# Patient Record
Sex: FEMALE | Race: WHITE
[De-identification: names, ages, dates, MRNs, and addresses within clinical notes are randomized per-mention and may not be internally consistent; named-entity substitution may affect disease eponyms.]

---

## 2022-12-27 ENCOUNTER — HOSPITAL ENCOUNTER (EMERGENCY)
Dept: HOSPITAL 47 - EC | Age: 62
LOS: 1 days | Discharge: HOME | End: 2022-12-28
Payer: MEDICARE

## 2022-12-27 VITALS — TEMPERATURE: 96.7 F | RESPIRATION RATE: 18 BRPM

## 2022-12-27 VITALS — HEART RATE: 72 BPM

## 2022-12-27 DIAGNOSIS — Z88.8: ICD-10-CM

## 2022-12-27 DIAGNOSIS — Z88.0: ICD-10-CM

## 2022-12-27 DIAGNOSIS — E11.65: Primary | ICD-10-CM

## 2022-12-27 DIAGNOSIS — Z88.1: ICD-10-CM

## 2022-12-27 DIAGNOSIS — Z88.2: ICD-10-CM

## 2022-12-27 DIAGNOSIS — N39.0: ICD-10-CM

## 2022-12-27 DIAGNOSIS — Z20.822: ICD-10-CM

## 2022-12-27 LAB
ALBUMIN SERPL-MCNC: 2.4 G/DL (ref 3.5–5)
ALP SERPL-CCNC: 180 U/L (ref 38–126)
ALT SERPL-CCNC: 27 U/L (ref 4–34)
ANION GAP SERPL CALC-SCNC: 4 MMOL/L
AST SERPL-CCNC: 25 U/L (ref 14–36)
BASOPHILS # BLD AUTO: 0 K/UL (ref 0–0.2)
BASOPHILS NFR BLD AUTO: 0 %
BUN SERPL-SCNC: 26 MG/DL (ref 7–17)
CALCIUM SPEC-MCNC: 7.7 MG/DL (ref 8.4–10.2)
CHLORIDE SERPL-SCNC: 103 MMOL/L (ref 98–107)
CO2 SERPL-SCNC: 26 MMOL/L (ref 22–30)
EOSINOPHIL # BLD AUTO: 0.1 K/UL (ref 0–0.7)
EOSINOPHIL NFR BLD AUTO: 1 %
ERYTHROCYTE [DISTWIDTH] IN BLOOD BY AUTOMATED COUNT: 3.03 M/UL (ref 3.8–5.4)
ERYTHROCYTE [DISTWIDTH] IN BLOOD: 13.2 % (ref 11.5–15.5)
GLUCOSE BLD-MCNC: 414 MG/DL (ref 70–110)
GLUCOSE BLD-MCNC: 519 MG/DL (ref 70–110)
GLUCOSE SERPL-MCNC: 412 MG/DL (ref 74–99)
GLUCOSE UR QL: (no result)
HCO3 BLDV-SCNC: 31 MMOL/L (ref 24–28)
HCT VFR BLD AUTO: 29.8 % (ref 34–46)
HGB BLD-MCNC: 10 GM/DL (ref 11.4–16)
LYMPHOCYTES # SPEC AUTO: 1.1 K/UL (ref 1–4.8)
LYMPHOCYTES NFR SPEC AUTO: 11 %
MAGNESIUM SPEC-SCNC: 1.5 MG/DL (ref 1.6–2.3)
MCH RBC QN AUTO: 32.9 PG (ref 25–35)
MCHC RBC AUTO-ENTMCNC: 33.4 G/DL (ref 31–37)
MCV RBC AUTO: 98.4 FL (ref 80–100)
MONOCYTES # BLD AUTO: 0.8 K/UL (ref 0–1)
MONOCYTES NFR BLD AUTO: 8 %
NEUTROPHILS # BLD AUTO: 7.4 K/UL (ref 1.3–7.7)
NEUTROPHILS NFR BLD AUTO: 77 %
PCO2 BLDV: 50 MMHG (ref 37–51)
PH BLDV: 7.41 [PH] (ref 7.31–7.41)
PH UR: 5.5 [PH] (ref 5–8)
PLATELET # BLD AUTO: 477 K/UL (ref 150–450)
POTASSIUM SERPL-SCNC: 4.1 MMOL/L (ref 3.5–5.1)
PROT SERPL-MCNC: 4.9 G/DL (ref 6.3–8.2)
RBC UR QL: <1 /HPF (ref 0–5)
SODIUM SERPL-SCNC: 133 MMOL/L (ref 137–145)
SP GR UR: 1.03 (ref 1–1.03)
SQUAMOUS UR QL AUTO: 1 /HPF (ref 0–4)
UROBILINOGEN UR QL STRIP: <2 MG/DL (ref ?–2)
WBC # BLD AUTO: 9.6 K/UL (ref 3.8–10.6)
WBC # UR AUTO: 1 /HPF (ref 0–5)

## 2022-12-27 PROCEDURE — 81001 URINALYSIS AUTO W/SCOPE: CPT

## 2022-12-27 PROCEDURE — 99285 EMERGENCY DEPT VISIT HI MDM: CPT

## 2022-12-27 PROCEDURE — 85025 COMPLETE CBC W/AUTO DIFF WBC: CPT

## 2022-12-27 PROCEDURE — 82009 KETONE BODYS QUAL: CPT

## 2022-12-27 PROCEDURE — 80053 COMPREHEN METABOLIC PANEL: CPT

## 2022-12-27 PROCEDURE — 71045 X-RAY EXAM CHEST 1 VIEW: CPT

## 2022-12-27 PROCEDURE — 87636 SARSCOV2 & INF A&B AMP PRB: CPT

## 2022-12-27 PROCEDURE — 83605 ASSAY OF LACTIC ACID: CPT

## 2022-12-27 PROCEDURE — 82803 BLOOD GASES ANY COMBINATION: CPT

## 2022-12-27 PROCEDURE — 96360 HYDRATION IV INFUSION INIT: CPT

## 2022-12-27 PROCEDURE — 83735 ASSAY OF MAGNESIUM: CPT

## 2022-12-27 PROCEDURE — 36415 COLL VENOUS BLD VENIPUNCTURE: CPT

## 2022-12-27 NOTE — XR
EXAMINATION TYPE: XR chest 1V portable

 

DATE OF EXAM: 12/27/2022 8:49 PM

 

COMPARISON: None

 

TECHNIQUE: XR chest 1V portable Portable AP radiograph of the chest.

 

CLINICAL INDICATION:Female, 62 years old with history of hyperglycemia; 

 

FINDINGS: 

Lungs/Pleura: Rotated exam There is no evidence of pleural effusion, focal consolidation, or pneumoth
orax.  

Pulmonary vascularity: Unremarkable.

Heart/mediastinum: Cardiomediastinal silhouette is enlarged and stable.

Musculoskeletal: Degenerative changes of the shoulder joints.

 

IMPRESSION: 

Rotated exam with cardiomegaly no obvious consolidation acute cardiopulmonary disease/process.

## 2022-12-27 NOTE — ED
General Adult HPI





- General


Chief complaint: Recheck/Abnormal Lab/Rx


Stated complaint: hyperglycemia


Time Seen by Provider: 12/27/22 19:01


Source: EMS, RN notes reviewed, old records reviewed


Mode of arrival: EMS


Limitations: altered mental status, physical limitation





- History of Present Illness


Initial comments: 





Patient is a 62-year-old female with past medical history remarkable for being 

nonverbal, diabetes, hyperlipidemia, who presents emergency Department after 

group home staff found that her point-of-care Accu-Chek read "high."  They were 

uncertain how to administer insulin.  Sent here for further evaluation.  Patient

is currently at her baseline per EMS.  She is unable to provide any form of 

history, as she is nonverbal.  She is extremely petite.  In no obvious distress.

 Presents for further evaluation.





- Related Data


                                  Previous Rx's











 Medication  Instructions  Recorded


 


Nitrofurantoin Monohyd/M-Cryst 100 mg PO Q12HR 7 Days #14 cap 12/28/22





[Macrobid]  











                                    Allergies











Allergy/AdvReac Type Severity Reaction Status Date / Time


 


fluoxetine [From Prozac] Allergy  Unknown Verified 12/27/22 19:04


 


Penicillins Allergy  Unknown Verified 12/27/22 19:04


 


sulfamethoxazole Allergy  Unknown Verified 12/27/22 19:04





[From Bactrim]     


 


trimethoprim [From Bactrim] Allergy  Unknown Verified 12/27/22 19:04














Review of Systems


ROS Statement: 


Those systems with pertinent positive or pertinent negative responses have been 

documented in the HPI.





ROS Other: All systems not noted in ROS Statement are negative.





Past Medical History


Past Medical History: Diabetes Mellitus, Hyperlipidemia, Thyroid Disorder


Additional Past Medical History / Comment(s): Type 2, Pulmonary aneurism, Heart 

murmur, Hypothyroidis, Hydrocephalus, non-verbal


History of Any Multi-Drug Resistant Organisms: Unobtainable


Past Surgical History: Unable to Obtain


Past Psychological History: Unable to Obtain


Smoking Status: Unknown if ever smoked


Past Alcohol Use History: Unable to Obtain


Past Drug Use History: Unable to Obtain





General Exam





- General Exam Comments


Initial Comments: 





General: Appears in no acute distress.


HEAD:  Normal with no signs of head trauma.


EYES:  PERRLA, EOMI, conjunctiva normal, no discharge.


ENT:  Hearing grossly intact, normal oropharynx.  Mildly dry mucosa membranes.


RESPIRATORY:  Clear breath sounds bilaterally.  No wheezes, rales, or rhonchi.  

No respiratory distress.


C/V:  Regular rate and rhythm. S1 and S2 auscultated, no edema, peripheral 

pulses 2+ and intact throughout


ABD:  Abd is soft, nontender, nondistended


EXT: Normal range of motion, no obvious deformity


SKIN:  No rashes or lesions observed on exposed skin.


NEURO: Alert.  Nonverbal.  Apparently at her baseline per EMS.


Limitations: altered mental status, physical limitation





Course


                                   Vital Signs











  12/27/22 12/27/22 12/28/22





  18:46 22:17 00:15


 


Temperature 96.7 F L  


 


Pulse Rate 75 72 


 


Respiratory 18 18 18





Rate   


 


Blood Pressure 105/58 110/68 130/70


 


O2 Sat by Pulse 93 L 97 96





Oximetry   














Medical Decision Making





- Medical Decision Making





Was pt. sent in by a medical professional or institution?


@  -[facility] home


Did you speak to anyone other than the patient for history?  


@  -EMS


Did you review nursing and triage notes? 


@  -Yes.  Agreed.


Were old charts reviewed? 


@  -Outside records from home.


Differential Diagnosis? 


@  -DKA, infection, poorly controlled diabetes.  Diagnosis is not limited to 

these differentials.


EKG interpreted by me (3pts min.)?


@  -none


X-rays interpreted by me (1pt min.)?


@  -Yes.  Chest x-ray reveals no acute cardio pulmonary process.  No infiltrate.


CT interpreted by me (1pt min.)?


@  -none


U/S interpreted by me (1pt. min.)?


@  -none


What testing was considered but not performed? (CT, X-rays, U/S, labs)? Why?


@None


What meds were considered but not given? Why?


@  -none


Did you discuss the management of the patient with other professionals?


@  -Known


Did you reconcile home meds?


@  -none


Was smoking cessation discussed for >3mins.?


@  -none


Was critical care preformed (if so, how long)?


@  -none


Were there social determinants of health that impacted care today? How? 

(Homelessness, low income, unemployed, alcoholism, drug addiction, 

transportation, low edu. Level, literacy, decrease access to med. care, detention, 

rehab)?


@  -None


Was there de-escalation of care discussed even if they declined? (Discuss DNR or

withdrawal of care, Hospice)?


@  -None


What co-morbidities impacted this encounter? (DM, HTN, Smoking, COPD, CAD, 

Cancer, CVA, Hep., AIDS, mental health diagnosis, sleep apnea, morbid obesity)?


@  -Diabetes


Was patient admitted / discharged?


@  -Based on the patient's presentation and physical exam, I'm concerned for 

hyperglycemia and the patient.  Unknown etiology as to why.  Vital signs within 

acceptable limits.  She is nonverbal and cannot provide much history.  We will 

work her up for DKA as well as infection.  She'll be given IV fluids to start.





Accu-Chek and sugar showed elevated blood sugars in the 500s.  Workup was relat

ively unremarkable for DKA or dehydration.  Urinalysis does appear to be 

positive for a UTI.  Flu, RSV, Covid negative.  No anion gap metabolic acidosis.





Following fluid bolus as well as 10 units of IV insulin, sugar has decreased 

down to 350.  I would like to obtain a blood sugar less than 300 and she can be 

discharged home.  We will administer another liter fluids as well as more 

insulin.  She'll be started on anabiotic for home and given a prescription.





Patient's repeat blood sugar was within acceptable limits for age she'll be 

discharged home at this time. Attempts made to update the patient's guardian, 

Twan Paz who did not answer. 





I will provide the patient with a prescription for Macrobid. I instructed the 

patient to follow up with their PCP in the next 1-3 days.  I explained that the 

patient should return to the emergency department if they experience any 

worsening symptoms. Strict return precautions were discussed with the patient. 

The patient expressed understanding of these instructions. I answered all 

questions that the patient had. The patient was discharged home in good 

condition with their prescriptions and follow up information.





Undiagnosed new problem with uncertain prognosis?


@  -UTI


Drug Therapy requiring intensive monitoring for toxicity (Heparin, Nitro, 

Insulin, Cardizem)?


@  -none


Were any procedures done?


@  -none


Diagnosis/symptom?


@  -UTI, acute hyperglycemia in the setting of insulin-dependent diabetes


Acute, or Chronic, or Acute on Chronic?


@  -Acute


Uncomplicated (without systemic symptoms) or Complicated (systemic symptoms)?


@  -Uncomplicated


Side effects of treatment?


@  -none


Exacerbation, Progression, or Severe Exacerbation]


@  -no


Poses a threat to life or bodily function?


@  -no





- Lab Data


Result diagrams: 


                                 12/27/22 19:14





                                 12/27/22 20:44


                                   Lab Results











  12/27/22 12/27/22 12/27/22 Range/Units





  19:14 19:14 19:14 


 


WBC  9.6    (3.8-10.6)  k/uL


 


RBC  3.03 L    (3.80-5.40)  m/uL


 


Hgb  10.0 L    (11.4-16.0)  gm/dL


 


Hct  29.8 L    (34.0-46.0)  %


 


MCV  98.4    (80.0-100.0)  fL


 


MCH  32.9    (25.0-35.0)  pg


 


MCHC  33.4    (31.0-37.0)  g/dL


 


RDW  13.2    (11.5-15.5)  %


 


Plt Count  477 H    (150-450)  k/uL


 


MPV  8.1    


 


Neutrophils %  77    %


 


Lymphocytes %  11    %


 


Monocytes %  8    %


 


Eosinophils %  1    %


 


Basophils %  0    %


 


Neutrophils #  7.4    (1.3-7.7)  k/uL


 


Lymphocytes #  1.1    (1.0-4.8)  k/uL


 


Monocytes #  0.8    (0-1.0)  k/uL


 


Eosinophils #  0.1    (0-0.7)  k/uL


 


Basophils #  0.0    (0-0.2)  k/uL


 


Hypochromasia  Slight    


 


VBG pH     (7.31-7.41)  


 


VBG pCO2     (37-51)  mmHg


 


VBG HCO3     (24-28)  mmol/L


 


Sodium     (137-145)  mmol/L


 


Potassium     (3.5-5.1)  mmol/L


 


Chloride     ()  mmol/L


 


Carbon Dioxide     (22-30)  mmol/L


 


Anion Gap     mmol/L


 


BUN     (7-17)  mg/dL


 


Creatinine     (0.52-1.04)  mg/dL


 


Est GFR (CKD-EPI)AfAm     (>60 ml/min/1.73 sqM)  


 


Est GFR (CKD-EPI)NonAf     (>60 ml/min/1.73 sqM)  


 


Glucose     (74-99)  mg/dL


 


POC Glucose (mg/dL)     ()  mg/dL


 


POC Glu Operater ID     


 


Plasma Lactic Acid Aurelio    1.1  (0.7-2.0)  mmol/L


 


Calcium     (8.4-10.2)  mg/dL


 


Magnesium     (1.6-2.3)  mg/dL


 


Total Bilirubin     (0.2-1.3)  mg/dL


 


AST     (14-36)  U/L


 


ALT     (4-34)  U/L


 


Alkaline Phosphatase     ()  U/L


 


Total Protein     (6.3-8.2)  g/dL


 


Albumin     (3.5-5.0)  g/dL


 


Urine Color   Light Yellow   


 


Urine Appearance   Clear   (Clear)  


 


Urine pH   5.5   (5.0-8.0)  


 


Ur Specific Gravity   1.029   (1.001-1.035)  


 


Urine Protein   Negative   (Negative)  


 


Urine Glucose (UA)   4+ H   (Negative)  


 


Urine Ketones   Negative   (Negative)  


 


Urine Blood   Negative   (Negative)  


 


Urine Nitrite   Positive H   (Negative)  


 


Urine Bilirubin   Negative   (Negative)  


 


Urine Urobilinogen   <2.0   (<2.0)  mg/dL


 


Ur Leukocyte Esterase   Negative   (Negative)  


 


Urine RBC   <1   (0-5)  /hpf


 


Urine WBC   1   (0-5)  /hpf


 


Ur Squamous Epith Cells   1   (0-4)  /hpf


 


Urine Bacteria   Occasional H   (None)  /hpf


 


Urine Mucus   Rare H   (None)  /hpf


 


Acetone, Qual     (Negative)  


 


Influenza Type A (PCR)     (Not Detectd)  


 


Influenza Type B (PCR)     (Not Detectd)  


 


RSV (PCR)     (Not Detectd)  


 


SARS-CoV-2 (PCR)     (Not Detectd)  














  12/27/22 12/27/22 12/27/22 Range/Units





  19:15 19:15 20:23 


 


WBC     (3.8-10.6)  k/uL


 


RBC     (3.80-5.40)  m/uL


 


Hgb     (11.4-16.0)  gm/dL


 


Hct     (34.0-46.0)  %


 


MCV     (80.0-100.0)  fL


 


MCH     (25.0-35.0)  pg


 


MCHC     (31.0-37.0)  g/dL


 


RDW     (11.5-15.5)  %


 


Plt Count     (150-450)  k/uL


 


MPV     


 


Neutrophils %     %


 


Lymphocytes %     %


 


Monocytes %     %


 


Eosinophils %     %


 


Basophils %     %


 


Neutrophils #     (1.3-7.7)  k/uL


 


Lymphocytes #     (1.0-4.8)  k/uL


 


Monocytes #     (0-1.0)  k/uL


 


Eosinophils #     (0-0.7)  k/uL


 


Basophils #     (0-0.2)  k/uL


 


Hypochromasia     


 


VBG pH   7.41   (7.31-7.41)  


 


VBG pCO2   50   (37-51)  mmHg


 


VBG HCO3   31 H   (24-28)  mmol/L


 


Sodium     (137-145)  mmol/L


 


Potassium     (3.5-5.1)  mmol/L


 


Chloride     ()  mmol/L


 


Carbon Dioxide     (22-30)  mmol/L


 


Anion Gap     mmol/L


 


BUN     (7-17)  mg/dL


 


Creatinine     (0.52-1.04)  mg/dL


 


Est GFR (CKD-EPI)AfAm     (>60 ml/min/1.73 sqM)  


 


Est GFR (CKD-EPI)NonAf     (>60 ml/min/1.73 sqM)  


 


Glucose     (74-99)  mg/dL


 


POC Glucose (mg/dL)    519 H  ()  mg/dL


 


POC Glu Operater ID    Charlie yusuf  


 


Plasma Lactic Acid Aurelio     (0.7-2.0)  mmol/L


 


Calcium     (8.4-10.2)  mg/dL


 


Magnesium     (1.6-2.3)  mg/dL


 


Total Bilirubin     (0.2-1.3)  mg/dL


 


AST     (14-36)  U/L


 


ALT     (4-34)  U/L


 


Alkaline Phosphatase     ()  U/L


 


Total Protein     (6.3-8.2)  g/dL


 


Albumin     (3.5-5.0)  g/dL


 


Urine Color     


 


Urine Appearance     (Clear)  


 


Urine pH     (5.0-8.0)  


 


Ur Specific Gravity     (1.001-1.035)  


 


Urine Protein     (Negative)  


 


Urine Glucose (UA)     (Negative)  


 


Urine Ketones     (Negative)  


 


Urine Blood     (Negative)  


 


Urine Nitrite     (Negative)  


 


Urine Bilirubin     (Negative)  


 


Urine Urobilinogen     (<2.0)  mg/dL


 


Ur Leukocyte Esterase     (Negative)  


 


Urine RBC     (0-5)  /hpf


 


Urine WBC     (0-5)  /hpf


 


Ur Squamous Epith Cells     (0-4)  /hpf


 


Urine Bacteria     (None)  /hpf


 


Urine Mucus     (None)  /hpf


 


Acetone, Qual     (Negative)  


 


Influenza Type A (PCR)  Not Detected    (Not Detectd)  


 


Influenza Type B (PCR)  Not Detected    (Not Detectd)  


 


RSV (PCR)  Not Detected    (Not Detectd)  


 


SARS-CoV-2 (PCR)  Not Detected    (Not Detectd)  














  12/27/22 12/27/22 12/28/22 Range/Units





  20:44 22:29 00:02 


 


WBC     (3.8-10.6)  k/uL


 


RBC     (3.80-5.40)  m/uL


 


Hgb     (11.4-16.0)  gm/dL


 


Hct     (34.0-46.0)  %


 


MCV     (80.0-100.0)  fL


 


MCH     (25.0-35.0)  pg


 


MCHC     (31.0-37.0)  g/dL


 


RDW     (11.5-15.5)  %


 


Plt Count     (150-450)  k/uL


 


MPV     


 


Neutrophils %     %


 


Lymphocytes %     %


 


Monocytes %     %


 


Eosinophils %     %


 


Basophils %     %


 


Neutrophils #     (1.3-7.7)  k/uL


 


Lymphocytes #     (1.0-4.8)  k/uL


 


Monocytes #     (0-1.0)  k/uL


 


Eosinophils #     (0-0.7)  k/uL


 


Basophils #     (0-0.2)  k/uL


 


Hypochromasia     


 


VBG pH     (7.31-7.41)  


 


VBG pCO2     (37-51)  mmHg


 


VBG HCO3     (24-28)  mmol/L


 


Sodium  133 L    (137-145)  mmol/L


 


Potassium  4.1    (3.5-5.1)  mmol/L


 


Chloride  103    ()  mmol/L


 


Carbon Dioxide  26    (22-30)  mmol/L


 


Anion Gap  4    mmol/L


 


BUN  26 H    (7-17)  mg/dL


 


Creatinine  0.73    (0.52-1.04)  mg/dL


 


Est GFR (CKD-EPI)AfAm  >90    (>60 ml/min/1.73 sqM)  


 


Est GFR (CKD-EPI)NonAf  >90    (>60 ml/min/1.73 sqM)  


 


Glucose  412 H    (74-99)  mg/dL


 


POC Glucose (mg/dL)   414 H  352 H  ()  mg/dL


 


POC Glu Operater BRAULIO yusuf, Charlie Reinoso  


 


Plasma Lactic Acid Aurelio     (0.7-2.0)  mmol/L


 


Calcium  7.7 L    (8.4-10.2)  mg/dL


 


Magnesium  1.5 L    (1.6-2.3)  mg/dL


 


Total Bilirubin  0.2    (0.2-1.3)  mg/dL


 


AST  25    (14-36)  U/L


 


ALT  27    (4-34)  U/L


 


Alkaline Phosphatase  180 H    ()  U/L


 


Total Protein  4.9 L    (6.3-8.2)  g/dL


 


Albumin  2.4 L    (3.5-5.0)  g/dL


 


Urine Color     


 


Urine Appearance     (Clear)  


 


Urine pH     (5.0-8.0)  


 


Ur Specific Gravity     (1.001-1.035)  


 


Urine Protein     (Negative)  


 


Urine Glucose (UA)     (Negative)  


 


Urine Ketones     (Negative)  


 


Urine Blood     (Negative)  


 


Urine Nitrite     (Negative)  


 


Urine Bilirubin     (Negative)  


 


Urine Urobilinogen     (<2.0)  mg/dL


 


Ur Leukocyte Esterase     (Negative)  


 


Urine RBC     (0-5)  /hpf


 


Urine WBC     (0-5)  /hpf


 


Ur Squamous Epith Cells     (0-4)  /hpf


 


Urine Bacteria     (None)  /hpf


 


Urine Mucus     (None)  /hpf


 


Acetone, Qual  Positive    (Negative)  


 


Influenza Type A (PCR)     (Not Detectd)  


 


Influenza Type B (PCR)     (Not Detectd)  


 


RSV (PCR)     (Not Detectd)  


 


SARS-CoV-2 (PCR)     (Not Detectd)  














Disposition


Clinical Impression: 


 Hyperglycemia, UTI (urinary tract infection)





Disposition: HOME SELF-CARE


Condition: Good


Prescriptions: 


Nitrofurantoin Monohyd/M-Cryst [Macrobid] 100 mg PO Q12HR 7 Days #14 cap


Is patient prescribed a controlled substance at d/c from ED?: No


Referrals: 


None,Stated [Primary Care Provider] - 1-2 days


Time of Disposition: 00:20

## 2022-12-28 VITALS — DIASTOLIC BLOOD PRESSURE: 70 MMHG | SYSTOLIC BLOOD PRESSURE: 130 MMHG

## 2022-12-28 LAB
GLUCOSE BLD-MCNC: 244 MG/DL (ref 70–110)
GLUCOSE BLD-MCNC: 352 MG/DL (ref 70–110)

## 2023-01-11 ENCOUNTER — HOSPITAL ENCOUNTER (EMERGENCY)
Dept: HOSPITAL 47 - EC | Age: 63
LOS: 1 days | Discharge: OTHER INSTITUTION | End: 2023-01-12
Payer: MEDICARE

## 2023-01-11 VITALS — TEMPERATURE: 97.6 F

## 2023-01-11 VITALS — RESPIRATION RATE: 12 BRPM

## 2023-01-11 DIAGNOSIS — Z20.822: ICD-10-CM

## 2023-01-11 DIAGNOSIS — Z88.1: ICD-10-CM

## 2023-01-11 DIAGNOSIS — Z88.0: ICD-10-CM

## 2023-01-11 DIAGNOSIS — E11.9: ICD-10-CM

## 2023-01-11 DIAGNOSIS — Z88.2: ICD-10-CM

## 2023-01-11 DIAGNOSIS — D64.9: ICD-10-CM

## 2023-01-11 DIAGNOSIS — K92.2: Primary | ICD-10-CM

## 2023-01-11 LAB
ALBUMIN SERPL-MCNC: 2.5 G/DL (ref 3.5–5)
ALP SERPL-CCNC: 249 U/L (ref 38–126)
ALT SERPL-CCNC: 31 U/L (ref 4–34)
ANION GAP SERPL CALC-SCNC: 7 MMOL/L
AST SERPL-CCNC: 34 U/L (ref 14–36)
BASOPHILS # BLD AUTO: 0 K/UL (ref 0–0.2)
BASOPHILS NFR BLD AUTO: 0 %
BUN SERPL-SCNC: 30 MG/DL (ref 7–17)
CALCIUM SPEC-MCNC: 8.5 MG/DL (ref 8.4–10.2)
CHLORIDE SERPL-SCNC: 106 MMOL/L (ref 98–107)
CO2 SERPL-SCNC: 27 MMOL/L (ref 22–30)
EOSINOPHIL # BLD AUTO: 0 K/UL (ref 0–0.7)
EOSINOPHIL NFR BLD AUTO: 0 %
ERYTHROCYTE [DISTWIDTH] IN BLOOD BY AUTOMATED COUNT: 2.75 M/UL (ref 3.8–5.4)
ERYTHROCYTE [DISTWIDTH] IN BLOOD: 14 % (ref 11.5–15.5)
GLUCOSE SERPL-MCNC: 67 MG/DL (ref 74–99)
HCT VFR BLD AUTO: 25.5 % (ref 34–46)
HGB BLD-MCNC: 8 GM/DL (ref 11.4–16)
LYMPHOCYTES # SPEC AUTO: 0.9 K/UL (ref 1–4.8)
LYMPHOCYTES NFR SPEC AUTO: 7 %
MAGNESIUM SPEC-SCNC: 1.8 MG/DL (ref 1.6–2.3)
MCH RBC QN AUTO: 29.1 PG (ref 25–35)
MCHC RBC AUTO-ENTMCNC: 31.4 G/DL (ref 31–37)
MCV RBC AUTO: 92.6 FL (ref 80–100)
MONOCYTES # BLD AUTO: 0.6 K/UL (ref 0–1)
MONOCYTES NFR BLD AUTO: 5 %
NEUTROPHILS # BLD AUTO: 11.9 K/UL (ref 1.3–7.7)
NEUTROPHILS NFR BLD AUTO: 87 %
PLATELET # BLD AUTO: 642 K/UL (ref 150–450)
POTASSIUM SERPL-SCNC: 4 MMOL/L (ref 3.5–5.1)
PROT SERPL-MCNC: 5.4 G/DL (ref 6.3–8.2)
SODIUM SERPL-SCNC: 140 MMOL/L (ref 137–145)
WBC # BLD AUTO: 13.7 K/UL (ref 3.8–10.6)

## 2023-01-11 PROCEDURE — 99284 EMERGENCY DEPT VISIT MOD MDM: CPT

## 2023-01-11 PROCEDURE — 83605 ASSAY OF LACTIC ACID: CPT

## 2023-01-11 PROCEDURE — 85025 COMPLETE CBC W/AUTO DIFF WBC: CPT

## 2023-01-11 PROCEDURE — 96375 TX/PRO/DX INJ NEW DRUG ADDON: CPT

## 2023-01-11 PROCEDURE — 84100 ASSAY OF PHOSPHORUS: CPT

## 2023-01-11 PROCEDURE — 87636 SARSCOV2 & INF A&B AMP PRB: CPT

## 2023-01-11 PROCEDURE — 96374 THER/PROPH/DIAG INJ IV PUSH: CPT

## 2023-01-11 PROCEDURE — 81001 URINALYSIS AUTO W/SCOPE: CPT

## 2023-01-11 PROCEDURE — 80053 COMPREHEN METABOLIC PANEL: CPT

## 2023-01-11 PROCEDURE — 36415 COLL VENOUS BLD VENIPUNCTURE: CPT

## 2023-01-11 PROCEDURE — 93005 ELECTROCARDIOGRAM TRACING: CPT

## 2023-01-11 PROCEDURE — 83735 ASSAY OF MAGNESIUM: CPT

## 2023-01-11 PROCEDURE — 96361 HYDRATE IV INFUSION ADD-ON: CPT

## 2023-01-11 NOTE — ED
Nausea/Vomiting/Diarrhea HPI





- General


Chief complaint: Nausea/Vomiting/Diarrhea


Stated complaint: dehydrated,weakness


Time Seen by Provider: 01/11/23 21:57


Source: RN notes reviewed, old records reviewed, Caregiver


Mode of arrival: wheelchair


Limitations: no limitations





- History of Present Illness


Initial comments: 





This is a 62-year-old female to the emergency department for evaluation patient 

is brought in by caregiver family for evaluation persistent nausea vomiting 

weakness not feeling well bodyaches pains.  Increasing lethargy decreased activi

ty level per staff was with patient at bedside patient did have dark coffee like

emesis multiple times prior to arrival was been sleeping excessively lately 

without doing much activity.


MD complaint: nausea, vomiting, diarrhea, other (Weakness)


-: days(s)


Description of Vomiting: food contents


Associated Abdominal Pain: Yes


Location: diffuse


Radiation: none


Severity: moderate


Severity scale (1-10): 4


Quality: cramping, stabbing


Consistency: constant


Improves with: none


Worsens with: none


Context: sick contacts


Associated Symptoms: myalgias, loss of appetite, nausea/vomiting, weakness





- Related Data


                                  Previous Rx's











 Medication  Instructions  Recorded


 


Nitrofurantoin Monohyd/M-Cryst 100 mg PO Q12HR 7 Days #14 cap 12/28/22





[Macrobid]  











                                    Allergies











Allergy/AdvReac Type Severity Reaction Status Date / Time


 


fluoxetine [From Prozac] Allergy  Unknown Verified 01/11/23 20:37


 


Penicillins Allergy  Unknown Verified 01/11/23 20:37


 


sulfamethoxazole Allergy  Unknown Verified 01/11/23 20:37





[From Bactrim]     


 


trimethoprim [From Bactrim] Allergy  Unknown Verified 01/11/23 20:37














Review of Systems


ROS Statement: 


Those systems with pertinent positive or pertinent negative responses have been 

documented in the HPI.





ROS Other: All systems not noted in ROS Statement are negative.





Past Medical History


Past Medical History: Diabetes Mellitus, Hyperlipidemia, Thyroid Disorder


Additional Past Medical History / Comment(s): Type 2, Pulmonary aneurism, Heart 

murmur, Hypothyroidis, Hydrocephalus, non-verbal


History of Any Multi-Drug Resistant Organisms: Unobtainable


Past Surgical History: Unable to Obtain


Past Psychological History: Unable to Obtain


Smoking Status: Unknown if ever smoked


Past Alcohol Use History: Unable to Obtain


Past Drug Use History: Unable to Obtain





General Exam


General appearance: alert, anxious, lethargic


Head exam: Present: atraumatic, normocephalic, normal inspection


Eye exam: Present: normal appearance, PERRL, EOMI.  Absent: scleral icterus, 

conjunctival injection, periorbital swelling


ENT exam: Present: normal exam, mucous membranes moist


Neck exam: Present: normal inspection.  Absent: tenderness, meningismus, 

lymphadenopathy


Respiratory exam: Present: normal lung sounds bilaterally.  Absent: respiratory 

distress, wheezes, rales, rhonchi, stridor


Cardiovascular Exam: Present: regular rate, normal rhythm, normal heart sounds. 

Absent: systolic murmur, diastolic murmur, rubs, gallop, clicks


GI/Abdominal exam: Present: soft, normal bowel sounds.  Absent: distended, 

tenderness, guarding, rebound, rigid


Extremities exam: Present: normal inspection, full ROM, normal capillary refill.

 Absent: tenderness, pedal edema, joint swelling, calf tenderness


Back exam: Present: normal inspection


Neurological exam: Present: alert, oriented X3, CN II-XII intact


Psychiatric exam: Present: normal affect, normal mood


Skin exam: Present: warm, dry, intact, normal color.  Absent: rash





Course


                                   Vital Signs











  01/11/23 01/11/23





  20:27 23:24


 


Temperature 97.6 F 


 


Pulse Rate 75 72


 


Respiratory 16 12





Rate  


 


Blood Pressure 109/66 110/69


 


O2 Sat by Pulse 98 98





Oximetry  














- Reevaluation(s)


Reevaluation #1: 





01/11/23 22:52


Medical record is reviewed


Reevaluation #2: 





01/11/23 22:53


Patient no real improvement here in the ER


Reevaluation #3: 





01/11/23 22:53


Patient family informed results and questions are answered


Reevaluation #4: 





01/11/23 22:53


Differential Weakness:


Hypoglycemia, shock, sepsis, hyponatremia, anemia, infection, MI, ETOH, adverse 

medicine reaction, overdose, stroke, this is not meant to be an all-inclusive 

list.


Reevaluation #5: 





01/11/23 22:53


Was pt. sent in by a medical professional or institution?


@  -no


Did you speak to anyone other than the patient for history?  


@  -no


Did you review nursing and triage notes? 


@  -agree


Were old charts reviewed? 


@  -Yes patient's prior hemoglobin was 10


Differential Diagnosis? 


@  -prior


EKG interpreted by me (3pts min.)?


@  -[none]


X-rays interpreted by me (1pt min.)?


@  -[none]


CT interpreted by me (1pt min.)?


@  -[none]


U/S interpreted by me (1pt. min.)?


@  -[none]


What testing was considered but not performed? (CT, X-rays, U/S, labs)? Why?


@  no


What meds were considered but not given? Why?


@  -[none]


Did you discuss the management of the patient with other professionals?


@  -no


Did you reconcile home meds?


@  -[none]


Was smoking cessation discussed for >3mins.?


@  -[none]


Was critical care preformed (if so, how long)?


@  -[none]


Were there social determinants of health that impacted care today? How? 

(Homelessness, low income, unemployed, alcoholism, drug addiction, 

transportation, low edu. Level, literacy, decrease access to med. care, long term, 

rehab)?


@  -no


Was there de-escalation of care discussed even if they declined? (Discuss DNR or

 withdrawal of care, Hospice)?


@  -no


What co-morbidities impacted this encounter? (DM, HTN, Smoking, COPD, CAD, 

Cancer, CVA, Hep., AIDS, mental health diagnosis, sleep apnea, morbid obesity)?


@  -no


Was patient admitted / discharged?


@  -admitted 


Undiagnosed new problem with uncertain prognosis?


@  -[none]


Drug Therapy requiring intensive monitoring for toxicity (Heparin, Nitro, 

Insulin, Cardizem)?


@  -[none]


Were any procedures done?


@  -[none]


Diagnosis/symptom?


@  -[default]


Acute, or Chronic, or Acute on Chronic?


@  -[default]


Uncomplicated (without systemic symptoms) or Complicated (systemic symptoms)?


@  -[default]


Side effects of treatment?


@  -[none]


Exacerbation, Progression, or Severe Exacerbation]


@  -[no]


Poses a threat to life or bodily function?


@  -[no]


01/12/23 00:34








Medical Decision Making





- Medical Decision Making





62 female to be transferred [facility] for GI evaluation is be a without GI 

consultation here in the emergency department this is an upper GI bleed with 

coffee ground emesis and anemia hemoglobin of 8





- Lab Data


Result diagrams: 


                                 01/11/23 23:06





                                 01/11/23 23:06


                                   Lab Results











  01/11/23 01/11/23 01/11/23 Range/Units





  20:35 23:06 23:06 


 


WBC   13.7 H   (3.8-10.6)  k/uL


 


RBC   2.75 L   (3.80-5.40)  m/uL


 


Hgb   8.0 L D   (11.4-16.0)  gm/dL


 


Hct   25.5 L   (34.0-46.0)  %


 


MCV   92.6  D   (80.0-100.0)  fL


 


MCH   29.1   (25.0-35.0)  pg


 


MCHC   31.4   (31.0-37.0)  g/dL


 


RDW   14.0   (11.5-15.5)  %


 


Plt Count   642 H   (150-450)  k/uL


 


MPV   6.9   


 


Neutrophils %   87   %


 


Lymphocytes %   7   %


 


Monocytes %   5   %


 


Eosinophils %   0   %


 


Basophils %   0   %


 


Neutrophils #   11.9 H   (1.3-7.7)  k/uL


 


Lymphocytes #   0.9 L   (1.0-4.8)  k/uL


 


Monocytes #   0.6   (0-1.0)  k/uL


 


Eosinophils #   0.0   (0-0.7)  k/uL


 


Basophils #   0.0   (0-0.2)  k/uL


 


Hypochromasia   Slight   


 


Poikilocytosis   Moderate   


 


Sodium    140  (137-145)  mmol/L


 


Potassium    4.0  (3.5-5.1)  mmol/L


 


Chloride    106  ()  mmol/L


 


Carbon Dioxide    27  (22-30)  mmol/L


 


Anion Gap    7  mmol/L


 


BUN    30 H  (7-17)  mg/dL


 


Creatinine    0.74  (0.52-1.04)  mg/dL


 


Est GFR (CKD-EPI)AfAm    >90  (>60 ml/min/1.73 sqM)  


 


Est GFR (CKD-EPI)NonAf    89  (>60 ml/min/1.73 sqM)  


 


Glucose    67 L  (74-99)  mg/dL


 


Plasma Lactic Acid Aurelio     (0.7-2.0)  mmol/L


 


Calcium    8.5  (8.4-10.2)  mg/dL


 


Phosphorus    3.7  (2.5-4.5)  mg/dL


 


Magnesium    1.8  (1.6-2.3)  mg/dL


 


Total Bilirubin    0.2  (0.2-1.3)  mg/dL


 


AST    34  (14-36)  U/L


 


ALT    31  (4-34)  U/L


 


Alkaline Phosphatase    249 H  ()  U/L


 


Total Protein    5.4 L  (6.3-8.2)  g/dL


 


Albumin    2.5 L  (3.5-5.0)  g/dL


 


Influenza Type A (PCR)  Not Detected    (Not Detectd)  


 


Influenza Type B (PCR)  Not Detected    (Not Detectd)  


 


RSV (PCR)  Not Detected    (Not Detectd)  


 


SARS-CoV-2 (PCR)  Not Detected    (Not Detectd)  














  01/11/23 Range/Units





  23:06 


 


WBC   (3.8-10.6)  k/uL


 


RBC   (3.80-5.40)  m/uL


 


Hgb   (11.4-16.0)  gm/dL


 


Hct   (34.0-46.0)  %


 


MCV   (80.0-100.0)  fL


 


MCH   (25.0-35.0)  pg


 


MCHC   (31.0-37.0)  g/dL


 


RDW   (11.5-15.5)  %


 


Plt Count   (150-450)  k/uL


 


MPV   


 


Neutrophils %   %


 


Lymphocytes %   %


 


Monocytes %   %


 


Eosinophils %   %


 


Basophils %   %


 


Neutrophils #   (1.3-7.7)  k/uL


 


Lymphocytes #   (1.0-4.8)  k/uL


 


Monocytes #   (0-1.0)  k/uL


 


Eosinophils #   (0-0.7)  k/uL


 


Basophils #   (0-0.2)  k/uL


 


Hypochromasia   


 


Poikilocytosis   


 


Sodium   (137-145)  mmol/L


 


Potassium   (3.5-5.1)  mmol/L


 


Chloride   ()  mmol/L


 


Carbon Dioxide   (22-30)  mmol/L


 


Anion Gap   mmol/L


 


BUN   (7-17)  mg/dL


 


Creatinine   (0.52-1.04)  mg/dL


 


Est GFR (CKD-EPI)AfAm   (>60 ml/min/1.73 sqM)  


 


Est GFR (CKD-EPI)NonAf   (>60 ml/min/1.73 sqM)  


 


Glucose   (74-99)  mg/dL


 


Plasma Lactic Acid Aurelio  0.8  (0.7-2.0)  mmol/L


 


Calcium   (8.4-10.2)  mg/dL


 


Phosphorus   (2.5-4.5)  mg/dL


 


Magnesium   (1.6-2.3)  mg/dL


 


Total Bilirubin   (0.2-1.3)  mg/dL


 


AST   (14-36)  U/L


 


ALT   (4-34)  U/L


 


Alkaline Phosphatase   ()  U/L


 


Total Protein   (6.3-8.2)  g/dL


 


Albumin   (3.5-5.0)  g/dL


 


Influenza Type A (PCR)   (Not Detectd)  


 


Influenza Type B (PCR)   (Not Detectd)  


 


RSV (PCR)   (Not Detectd)  


 


SARS-CoV-2 (PCR)   (Not Detectd)  














- EKG Data


-: EKG Interpreted by Me (TEG is sinus rhythm 79  QRS1 35 )





Disposition


Clinical Impression: 


 UGIB (upper gastrointestinal bleed), Nausea & vomiting, Anemia, Weakness





Disposition: OTHER INSTITUTION NOT DEFINED


Condition: Serious


Is patient prescribed a controlled substance at d/c from ED?: No


Referrals: 


Gabino Mcmahan DO [Primary Care Provider] - 1-2 days


Time of Disposition: 00:45





- Out of Hospital Transfer - Req. Specs


Out of Hospital Transfer - Requested Specifics: Other Emergency Center ([facility])

## 2023-01-12 VITALS — DIASTOLIC BLOOD PRESSURE: 55 MMHG | SYSTOLIC BLOOD PRESSURE: 112 MMHG | HEART RATE: 75 BPM

## 2023-01-12 LAB
GLUCOSE BLD-MCNC: 237 MG/DL (ref 70–110)
GLUCOSE BLD-MCNC: 47 MG/DL (ref 70–110)
GLUCOSE BLD-MCNC: 64 MG/DL (ref 70–110)
PH UR: 6 [PH] (ref 5–8)
PROT UR QL: (no result)
RBC UR QL: <1 /HPF (ref 0–5)
SP GR UR: 1.03 (ref 1–1.03)
SQUAMOUS UR QL AUTO: <1 /HPF (ref 0–4)
UROBILINOGEN UR QL STRIP: 2 MG/DL (ref ?–2)
WBC #/AREA URNS HPF: 2 /HPF (ref 0–5)